# Patient Record
Sex: MALE | Race: WHITE | NOT HISPANIC OR LATINO | Employment: FULL TIME | URBAN - METROPOLITAN AREA
[De-identification: names, ages, dates, MRNs, and addresses within clinical notes are randomized per-mention and may not be internally consistent; named-entity substitution may affect disease eponyms.]

---

## 2024-09-23 ENCOUNTER — APPOINTMENT (OUTPATIENT)
Dept: RADIOLOGY | Facility: HOSPITAL | Age: 49
End: 2024-09-23
Payer: COMMERCIAL

## 2024-09-23 ENCOUNTER — HOSPITAL ENCOUNTER (EMERGENCY)
Facility: HOSPITAL | Age: 49
Discharge: HOME | End: 2024-09-23
Attending: EMERGENCY MEDICINE
Payer: COMMERCIAL

## 2024-09-23 ENCOUNTER — APPOINTMENT (OUTPATIENT)
Dept: CARDIOLOGY | Facility: HOSPITAL | Age: 49
End: 2024-09-23
Payer: COMMERCIAL

## 2024-09-23 VITALS
BODY MASS INDEX: 33.13 KG/M2 | HEIGHT: 73 IN | DIASTOLIC BLOOD PRESSURE: 79 MMHG | SYSTOLIC BLOOD PRESSURE: 165 MMHG | TEMPERATURE: 97.9 F | WEIGHT: 250 LBS | HEART RATE: 88 BPM | OXYGEN SATURATION: 98 % | RESPIRATION RATE: 16 BRPM

## 2024-09-23 DIAGNOSIS — M54.6 CHRONIC BILATERAL THORACIC BACK PAIN: Primary | ICD-10-CM

## 2024-09-23 DIAGNOSIS — G89.29 CHRONIC BILATERAL THORACIC BACK PAIN: Primary | ICD-10-CM

## 2024-09-23 LAB
ALBUMIN SERPL BCP-MCNC: 4.7 G/DL (ref 3.4–5)
ALP SERPL-CCNC: 46 U/L (ref 33–120)
ALT SERPL W P-5'-P-CCNC: 23 U/L (ref 10–52)
ANION GAP SERPL CALC-SCNC: 11 MMOL/L (ref 10–20)
APPEARANCE UR: CLEAR
AST SERPL W P-5'-P-CCNC: 19 U/L (ref 9–39)
ATRIAL RATE: 72 BPM
BASOPHILS # BLD AUTO: 0.05 X10*3/UL (ref 0–0.1)
BASOPHILS NFR BLD AUTO: 0.6 %
BILIRUB SERPL-MCNC: 1.2 MG/DL (ref 0–1.2)
BILIRUB UR STRIP.AUTO-MCNC: NEGATIVE MG/DL
BUN SERPL-MCNC: 12 MG/DL (ref 6–23)
CALCIUM SERPL-MCNC: 9.4 MG/DL (ref 8.6–10.3)
CARDIAC TROPONIN I PNL SERPL HS: 3 NG/L (ref 0–20)
CHLORIDE SERPL-SCNC: 104 MMOL/L (ref 98–107)
CO2 SERPL-SCNC: 28 MMOL/L (ref 21–32)
COLOR UR: COLORLESS
CREAT SERPL-MCNC: 1.12 MG/DL (ref 0.5–1.3)
EGFRCR SERPLBLD CKD-EPI 2021: 80 ML/MIN/1.73M*2
EOSINOPHIL # BLD AUTO: 0.52 X10*3/UL (ref 0–0.7)
EOSINOPHIL NFR BLD AUTO: 6.7 %
ERYTHROCYTE [DISTWIDTH] IN BLOOD BY AUTOMATED COUNT: 12.8 % (ref 11.5–14.5)
GLUCOSE SERPL-MCNC: 112 MG/DL (ref 74–99)
GLUCOSE UR STRIP.AUTO-MCNC: NORMAL MG/DL
HCT VFR BLD AUTO: 52 % (ref 41–52)
HGB BLD-MCNC: 17.1 G/DL (ref 13.5–17.5)
HOLD SPECIMEN: NORMAL
IMM GRANULOCYTES # BLD AUTO: 0.03 X10*3/UL (ref 0–0.7)
IMM GRANULOCYTES NFR BLD AUTO: 0.4 % (ref 0–0.9)
KETONES UR STRIP.AUTO-MCNC: NEGATIVE MG/DL
LEUKOCYTE ESTERASE UR QL STRIP.AUTO: NEGATIVE
LIPASE SERPL-CCNC: 39 U/L (ref 9–82)
LYMPHOCYTES # BLD AUTO: 2.31 X10*3/UL (ref 1.2–4.8)
LYMPHOCYTES NFR BLD AUTO: 29.7 %
MCH RBC QN AUTO: 29.1 PG (ref 26–34)
MCHC RBC AUTO-ENTMCNC: 32.9 G/DL (ref 32–36)
MCV RBC AUTO: 88 FL (ref 80–100)
MONOCYTES # BLD AUTO: 0.66 X10*3/UL (ref 0.1–1)
MONOCYTES NFR BLD AUTO: 8.5 %
NEUTROPHILS # BLD AUTO: 4.21 X10*3/UL (ref 1.2–7.7)
NEUTROPHILS NFR BLD AUTO: 54.1 %
NITRITE UR QL STRIP.AUTO: NEGATIVE
NRBC BLD-RTO: 0 /100 WBCS (ref 0–0)
P AXIS: 46 DEGREES
P OFFSET: 181 MS
P ONSET: 124 MS
PH UR STRIP.AUTO: 7 [PH]
PLATELET # BLD AUTO: 165 X10*3/UL (ref 150–450)
POTASSIUM SERPL-SCNC: 3.8 MMOL/L (ref 3.5–5.3)
PR INTERVAL: 196 MS
PROT SERPL-MCNC: 6.9 G/DL (ref 6.4–8.2)
PROT UR STRIP.AUTO-MCNC: NEGATIVE MG/DL
Q ONSET: 222 MS
QRS COUNT: 12 BEATS
QRS DURATION: 84 MS
QT INTERVAL: 374 MS
QTC CALCULATION(BAZETT): 409 MS
QTC FREDERICIA: 397 MS
R AXIS: 143 DEGREES
RBC # BLD AUTO: 5.88 X10*6/UL (ref 4.5–5.9)
RBC # UR STRIP.AUTO: NEGATIVE /UL
SODIUM SERPL-SCNC: 139 MMOL/L (ref 136–145)
SP GR UR STRIP.AUTO: 1
T AXIS: -4 DEGREES
T OFFSET: 409 MS
UROBILINOGEN UR STRIP.AUTO-MCNC: NORMAL MG/DL
VENTRICULAR RATE: 72 BPM
WBC # BLD AUTO: 7.8 X10*3/UL (ref 4.4–11.3)

## 2024-09-23 PROCEDURE — 84484 ASSAY OF TROPONIN QUANT: CPT | Performed by: EMERGENCY MEDICINE

## 2024-09-23 PROCEDURE — 72070 X-RAY EXAM THORAC SPINE 2VWS: CPT

## 2024-09-23 PROCEDURE — 36415 COLL VENOUS BLD VENIPUNCTURE: CPT

## 2024-09-23 PROCEDURE — 72070 X-RAY EXAM THORAC SPINE 2VWS: CPT | Mod: FOREIGN READ | Performed by: RADIOLOGY

## 2024-09-23 PROCEDURE — 80053 COMPREHEN METABOLIC PANEL: CPT

## 2024-09-23 PROCEDURE — 99284 EMERGENCY DEPT VISIT MOD MDM: CPT | Mod: 25

## 2024-09-23 PROCEDURE — 85025 COMPLETE CBC W/AUTO DIFF WBC: CPT

## 2024-09-23 PROCEDURE — 96374 THER/PROPH/DIAG INJ IV PUSH: CPT

## 2024-09-23 PROCEDURE — 72100 X-RAY EXAM L-S SPINE 2/3 VWS: CPT | Mod: FOREIGN READ | Performed by: RADIOLOGY

## 2024-09-23 PROCEDURE — 83690 ASSAY OF LIPASE: CPT

## 2024-09-23 PROCEDURE — 81003 URINALYSIS AUTO W/O SCOPE: CPT

## 2024-09-23 PROCEDURE — 93005 ELECTROCARDIOGRAM TRACING: CPT

## 2024-09-23 PROCEDURE — 2500000004 HC RX 250 GENERAL PHARMACY W/ HCPCS (ALT 636 FOR OP/ED)

## 2024-09-23 PROCEDURE — 72100 X-RAY EXAM L-S SPINE 2/3 VWS: CPT

## 2024-09-23 RX ORDER — NAPROXEN 500 MG/1
500 TABLET ORAL EVERY 12 HOURS PRN
Qty: 20 TABLET | Refills: 0 | Status: SHIPPED | OUTPATIENT
Start: 2024-09-23

## 2024-09-23 RX ORDER — KETOROLAC TROMETHAMINE 15 MG/ML
15 INJECTION, SOLUTION INTRAMUSCULAR; INTRAVENOUS ONCE
Status: COMPLETED | OUTPATIENT
Start: 2024-09-23 | End: 2024-09-23

## 2024-09-23 RX ORDER — CYCLOBENZAPRINE HCL 5 MG
10 TABLET ORAL EVERY 8 HOURS PRN
Qty: 15 TABLET | Refills: 0 | Status: SHIPPED | OUTPATIENT
Start: 2024-09-23

## 2024-09-23 ASSESSMENT — PAIN DESCRIPTION - ORIENTATION: ORIENTATION: MID

## 2024-09-23 ASSESSMENT — PAIN DESCRIPTION - PROGRESSION: CLINICAL_PROGRESSION: NOT CHANGED

## 2024-09-23 ASSESSMENT — PAIN DESCRIPTION - LOCATION: LOCATION: BACK

## 2024-09-23 ASSESSMENT — COLUMBIA-SUICIDE SEVERITY RATING SCALE - C-SSRS
1. IN THE PAST MONTH, HAVE YOU WISHED YOU WERE DEAD OR WISHED YOU COULD GO TO SLEEP AND NOT WAKE UP?: NO
2. HAVE YOU ACTUALLY HAD ANY THOUGHTS OF KILLING YOURSELF?: NO
6. HAVE YOU EVER DONE ANYTHING, STARTED TO DO ANYTHING, OR PREPARED TO DO ANYTHING TO END YOUR LIFE?: NO

## 2024-09-23 ASSESSMENT — PAIN DESCRIPTION - ONSET: ONSET: SUDDEN

## 2024-09-23 ASSESSMENT — PAIN DESCRIPTION - FREQUENCY: FREQUENCY: CONSTANT/CONTINUOUS

## 2024-09-23 ASSESSMENT — PAIN SCALES - GENERAL
PAINLEVEL_OUTOF10: 0 - NO PAIN
PAINLEVEL_OUTOF10: 3
PAINLEVEL_OUTOF10: 8

## 2024-09-23 ASSESSMENT — PAIN - FUNCTIONAL ASSESSMENT: PAIN_FUNCTIONAL_ASSESSMENT: 0-10

## 2024-09-23 ASSESSMENT — PAIN DESCRIPTION - PAIN TYPE: TYPE: ACUTE PAIN

## 2024-09-23 ASSESSMENT — PAIN DESCRIPTION - DESCRIPTORS: DESCRIPTORS: DISCOMFORT

## 2024-09-23 NOTE — DISCHARGE INSTRUCTIONS
Seek immediate medical attention if you develop: increasing pain, numbness, tingling, weakness, loss of motion in your arms or legs, loss of control of your urine or stool, fever, abdominal pain, chest pain, shortness of breath, or any new or worsening symptoms.    Follow up your elevated blood pressure with your doctor

## 2024-09-23 NOTE — ED PROVIDER NOTES
EMERGENCY DEPARTMENT ENCOUNTER      Pt Name: Will Guevara  MRN: [id number]  Birthdate 1975  Date of evaluation: 9/23/2024    HISTORY OF PRESENT ILLNESS    Will Guevara is an 49 y.o. male with history including IBS with frequent constipation presenting to the emergency department for back pain concerning for pancreatitis.  Patient states that he has been having back tightness and back discomfort for over a few weeks ago.  In the last 3 weeks he has noticed increasing pain and discomfort.  Every once while he has pain that shoots down the spinal column.  He denies any abdominal pain, nausea, vomiting.  Patient denies any chest pain or tightness.,  Shortness of breath or fevers or chills.  Patient states that he looked up online and said that he had pancreatitis so he is concerned for pancreatitis.  Patient has no history of pancreatitis.    Smokes cigars  Alcohol 1-2 drinks every few months  Denies any illicit drugs      PAST MEDICAL HISTORY   No past medical history on file.    SURGICAL HISTORY     No past surgical history on file.    CURRENT MEDICATIONS       Discharge Medication List as of 9/23/2024 10:26 AM          ALLERGIES     Patient has no known allergies.    FAMILY HISTORY     No family history on file.     SOCIAL HISTORY       Social History     Socioeconomic History    Marital status:        PHYSICAL EXAM       ED Triage Vitals [09/23/24 0702]   Temperature Heart Rate Respirations BP   36.6 °C (97.9 °F) (!) 101 18 (!) 186/100      Pulse Ox Temp Source Heart Rate Source Patient Position   99 % Temporal Monitor Sitting      BP Location FiO2 (%)     Right arm --       Physical Exam  Vitals and nursing note reviewed.   Constitutional:       General: He is not in acute distress.     Appearance: He is well-developed.   HENT:      Head: Normocephalic and atraumatic.   Cardiovascular:      Rate and Rhythm: Normal rate and regular rhythm.      Heart sounds: No murmur heard.  Pulmonary:      Effort:  Pulmonary effort is normal. No respiratory distress.      Breath sounds: Normal breath sounds.   Abdominal:      General: There is no distension.      Palpations: Abdomen is soft. There is no mass.      Tenderness: There is no abdominal tenderness. There is no right CVA tenderness or left CVA tenderness.   Musculoskeletal:      Comments: Kyphotic curve  Hypertonic paraspinal muscles, no tenderness to palpation   Skin:     General: Skin is warm and dry.   Neurological:      General: No focal deficit present.      Mental Status: He is alert and oriented to person, place, and time.          DIAGNOSTIC RESULTS     LABS:  Labs Reviewed   COMPREHENSIVE METABOLIC PANEL - Abnormal       Result Value    Glucose 112 (*)     Sodium 139      Potassium 3.8      Chloride 104      Bicarbonate 28      Anion Gap 11      Urea Nitrogen 12      Creatinine 1.12      eGFR 80      Calcium 9.4      Albumin 4.7      Alkaline Phosphatase 46      Total Protein 6.9      AST 19      Bilirubin, Total 1.2      ALT 23     URINALYSIS WITH REFLEX CULTURE AND MICROSCOPIC - Abnormal    Color, Urine Colorless (*)     Appearance, Urine Clear      Specific Gravity, Urine 1.001 (*)     pH, Urine 7.0      Protein, Urine NEGATIVE      Glucose, Urine Normal      Blood, Urine NEGATIVE      Ketones, Urine NEGATIVE      Bilirubin, Urine NEGATIVE      Urobilinogen, Urine Normal      Nitrite, Urine NEGATIVE      Leukocyte Esterase, Urine NEGATIVE     LIPASE - Normal    Lipase 39      Narrative:     Venipuncture immediately after or during the administration of Metamizole may lead to falsely low results. Testing should be performed immediately prior to Metamizole dosing.   TROPONIN I, HIGH SENSITIVITY - Normal    Troponin I, High Sensitivity 3      Narrative:     Less than 99th percentile of normal range cutoff-  Female and children under 18 years old <14 ng/L; Male <21 ng/L: Negative  Repeat testing should be performed if clinically indicated.     Female and  children under 18 years old 14-50 ng/L; Male 21-50 ng/L:  Consistent with possible cardiac damage and possible increased clinical   risk. Serial measurements may help to assess extent of myocardial damage.     >50 ng/L: Consistent with cardiac damage, increased clinical risk and  myocardial infarction. Serial measurements may help assess extent of   myocardial damage.      NOTE: Children less than 1 year old may have higher baseline troponin   levels and results should be interpreted in conjunction with the overall   clinical context.     NOTE: Troponin I testing is performed using a different   testing methodology at [facility] than at other   [facility]. Direct result comparisons should only   be made within the same method.   CBC WITH AUTO DIFFERENTIAL    WBC 7.8      nRBC 0.0      RBC 5.88      Hemoglobin 17.1      Hematocrit 52.0      MCV 88      MCH 29.1      MCHC 32.9      RDW 12.8      Platelets 165      Neutrophils % 54.1      Immature Granulocytes %, Automated 0.4      Lymphocytes % 29.7      Monocytes % 8.5      Eosinophils % 6.7      Basophils % 0.6      Neutrophils Absolute 4.21      Immature Granulocytes Absolute, Automated 0.03      Lymphocytes Absolute 2.31      Monocytes Absolute 0.66      Eosinophils Absolute 0.52      Basophils Absolute 0.05     URINALYSIS WITH REFLEX CULTURE AND MICROSCOPIC    Narrative:     The following orders were created for panel order Urinalysis with Reflex Culture and Microscopic.  Procedure                               Abnormality         Status                     ---------                               -----------         ------                     Urinalysis with Reflex C...[[id number]]  Abnormal            Final result               Extra Urine Gray Tube[[id number]]                            Final result                 Please view results for these tests on the individual orders.   EXTRA URINE GRAY TUBE    Extra Tube Hold for add-ons.         All  other labs were within normal range or not returned as of this dictation.    Imaging  XR lumbar spine 2-3 views   Final Result   No acute findings.  Chronic wedging of lower thoracic vertebrae, T11   and T12 without evidence of retropulsion.   Lumbar spondylosis with disc space narrowing notable at L1-2 level.   Consider MRI for further evaluation as clinically indicated.   Signed by Zheng Durand MD      XR thoracic spine 2 views   Final Result   No acute findings.  Chronic wedging of lower thoracic vertebrae, T11   and T12 without evidence of retropulsion.   Lumbar spondylosis with disc space narrowing notable at L1-2 level.   Consider MRI for further evaluation as clinically indicated.   Signed by Zheng Durand MD           Procedures  Procedures     EMERGENCY DEPARTMENT COURSE/MDM:   Medical Decision Making  Will Guevara is an 49 y.o. male with history including IBS with frequent constipation presenting to the emergency department for back pain concerning for pancreatitis.  I have low suspicion for pancreatitis based on history and story.  Most likely MSK.    X-ray imaging to evaluate thoracic and lumbar spine.  Patient was given Toradol for pain control.    Lab results reviewed interpreted independently.  Patient has no major electrolyte abnormalities no evidence of acute kidney injury or elevated liver enzymes.  Troponin negative low concern for ACS.  Urinalysis negative low concern for pyelonephritis.    X-ray imaging reviewed shows chronic wedge formation of the lower thoracic vertebrae T11 and T12 without retropulsion and then there is lumbar spondylosis with disc space narrowing notable at L1 and L2.  On his discussion with attending patient noted that this pain is actually been going on for 30 years.  These chronic findings seen on x-ray imaging is consistent with MSK.  Patient discharged with outpatient follow-up.    =================Attending note===============    The patient was seen by the  resident/fellow.  I have personally performed a substantive portion of the encounter.  I have seen and examined the patient; agree with the workup, evaluation, MDM,   management and diagnosis.  The care plan has been discussed with the resident; I have reviewed the resident's note and agree with the documented findings.      This is a 49 y.o. male who presents to ER with mid to lower thoracic back pain that he describes as spasm.  He is had this for around 30 years.  Is been worse for the last few weeks.  There is no nausea or vomiting or diarrhea.  No fevers or chills.  Does get some occasional anterior abdominal pain.  No falls or injuries.  No chest pain or trouble breathing.  He denies any chronic medical conditions.  He has not seen his doctor for the symptoms.  Patient is concerned he may have pancreatitis because he went online and he thought these were what his symptoms were consistent with.  On exam and his story, they do not seem overly consistent with pancreatitis.  We will check a lipase.  Heart is regular.  Lungs are clear.  Abdomen is soft and nontender.  He does point to his lower abdomen is an area where he gets the pain occasionally.  There is no guarding or rebound.  No peritoneal signs.  No CVA tenderness.  No saddle anesthesia. Sensation fully intact in the legs. Pedal pulses intact. Full strength and range of motion against resistance with flexion and extension at the hips, knees, ankles, toes. Deep tendon reflexes are 2+ at the bilateral patella and Achilles. Patient is able to stand on their toes and his heels without any difficulty.  Patient does have some tenderness in the lower thoracic region                   SENSORY                     MOTOR                               DTR                   Medial Leg                  Walk on Heels                      Patellar       L4 Right  [2]                                    [5]                  [2]            Left     [2]                                     [5]                  [2]                   Top of Foot                 Ext Big Toe                         None       L5 Right  [2]                                    [5]           Left     [2]                                     [5]                   Sole of Foot                Walk on Toes                      Achilles       S1 Right  [2]                                    [5]                   [2]                           Left     [2]                                    [5]                                 [2]    Urines unremarkable.  Chemistry is unremarkable.  CBC is unremarkable.  Lipase is normal.    Patient is discharged home with Flexeril and naproxen.  He is to follow-up with his doctor.  He is to return to the nearest ER for any new or worsening symptoms.    EKG: Normal sinus rhythm with a ventricular rate of 72.  VT interval 196.  QTc 409.  No ST changes.    ==========================================          Diagnoses as of 09/24/24 0558   Chronic bilateral thoracic back pain        External records reviewed: recent inpatient, clinic, and prior ED notes  Labs and Diagnostic imaging independently reviewed/interpreted by me.    Patient plan, care, lab results and imaging were all discussed with attending.    ED Medications administered this visit:    Medications   ketorolac (Toradol) injection 15 mg (15 mg intravenous Given 9/23/24 0755)     New Prescriptions from this visit:    Discharge Medication List as of 9/23/2024 10:26 AM        START taking these medications    Details   cyclobenzaprine (Flexeril) 5 mg tablet Take 2 tablets (10 mg) by mouth every 8 hours if needed for muscle spasms for up to 15 doses., Starting Mon 9/23/2024, Normal      naproxen (Naprosyn) 500 mg tablet Take 1 tablet (500 mg) by mouth every 12 hours if needed for mild pain (1 - 3) or moderate pain (4 - 6)., Starting Mon 9/23/2024, Normal             (Please note that portions of this note were completed with a voice  recognition program.  Efforts were made to edit the dictations but occasionally words are mis-transcribed.)     China Franco DO  Resident  09/24/24 7352

## 2024-10-05 LAB
ATRIAL RATE: 72 BPM
P AXIS: 46 DEGREES
P OFFSET: 181 MS
P ONSET: 124 MS
PR INTERVAL: 196 MS
Q ONSET: 222 MS
QRS COUNT: 12 BEATS
QRS DURATION: 84 MS
QT INTERVAL: 374 MS
QTC CALCULATION(BAZETT): 409 MS
QTC FREDERICIA: 397 MS
R AXIS: 143 DEGREES
T AXIS: -4 DEGREES
T OFFSET: 409 MS
VENTRICULAR RATE: 72 BPM